# Patient Record
Sex: FEMALE | Race: BLACK OR AFRICAN AMERICAN | Employment: OTHER | URBAN - METROPOLITAN AREA
[De-identification: names, ages, dates, MRNs, and addresses within clinical notes are randomized per-mention and may not be internally consistent; named-entity substitution may affect disease eponyms.]

---

## 2019-01-21 ENCOUNTER — APPOINTMENT (OUTPATIENT)
Dept: GENERAL RADIOLOGY | Age: 69
End: 2019-01-21
Payer: COMMERCIAL

## 2019-01-21 ENCOUNTER — HOSPITAL ENCOUNTER (EMERGENCY)
Age: 69
Discharge: HOME | End: 2019-01-21
Payer: COMMERCIAL

## 2019-01-21 VITALS
HEART RATE: 99 BPM | RESPIRATION RATE: 16 BRPM | DIASTOLIC BLOOD PRESSURE: 73 MMHG | TEMPERATURE: 98.5 F | OXYGEN SATURATION: 95 % | BODY MASS INDEX: 42.1 KG/M2 | SYSTOLIC BLOOD PRESSURE: 112 MMHG | WEIGHT: 268.8 LBS

## 2019-01-21 DIAGNOSIS — J01.90 ACUTE SINUSITIS, RECURRENCE NOT SPECIFIED, UNSPECIFIED LOCATION: Primary | ICD-10-CM

## 2019-01-21 PROCEDURE — 71046 X-RAY EXAM CHEST 2 VIEWS: CPT

## 2019-01-21 PROCEDURE — 99284 EMERGENCY DEPT VISIT MOD MDM: CPT

## 2019-01-21 RX ORDER — LEVOFLOXACIN 500 MG/1
500 TABLET, FILM COATED ORAL DAILY
Qty: 10 TABLET | Refills: 0 | Status: SHIPPED | OUTPATIENT
Start: 2019-01-21 | End: 2019-01-31

## 2019-01-21 RX ORDER — FLUTICASONE PROPIONATE 50 MCG
1 SPRAY, SUSPENSION (ML) NASAL DAILY
Qty: 1 BOTTLE | Refills: 0 | Status: SHIPPED | OUTPATIENT
Start: 2019-01-21

## 2019-01-21 ASSESSMENT — ENCOUNTER SYMPTOMS
DIARRHEA: 0
ABDOMINAL PAIN: 0
SINUS PAIN: 1
COUGH: 1
SHORTNESS OF BREATH: 0
CHEST TIGHTNESS: 0
VOMITING: 0
NAUSEA: 0

## 2020-04-20 ENCOUNTER — HOSPITAL ENCOUNTER (EMERGENCY)
Age: 70
Discharge: HOME | End: 2020-04-20
Attending: EMERGENCY MEDICINE
Payer: COMMERCIAL

## 2020-04-20 VITALS
HEART RATE: 81 BPM | TEMPERATURE: 97.4 F | BODY MASS INDEX: 42.38 KG/M2 | DIASTOLIC BLOOD PRESSURE: 92 MMHG | OXYGEN SATURATION: 96 % | HEIGHT: 67 IN | WEIGHT: 270 LBS | RESPIRATION RATE: 18 BRPM | SYSTOLIC BLOOD PRESSURE: 148 MMHG

## 2020-04-20 PROCEDURE — 99283 EMERGENCY DEPT VISIT LOW MDM: CPT

## 2020-04-20 RX ORDER — DIPHENHYDRAMINE HCL 25 MG
25 TABLET ORAL EVERY 6 HOURS PRN
COMMUNITY

## 2020-04-20 RX ORDER — ATORVASTATIN CALCIUM 10 MG/1
10 TABLET, FILM COATED ORAL DAILY
COMMUNITY

## 2020-04-20 RX ORDER — IPRATROPIUM BROMIDE AND ALBUTEROL SULFATE 2.5; .5 MG/3ML; MG/3ML
1 SOLUTION RESPIRATORY (INHALATION) EVERY 6 HOURS PRN
COMMUNITY

## 2020-04-20 RX ORDER — TIZANIDINE 4 MG/1
4 TABLET ORAL NIGHTLY
COMMUNITY

## 2020-04-20 RX ORDER — CLOPIDOGREL BISULFATE 75 MG/1
75 TABLET ORAL DAILY
COMMUNITY

## 2020-04-20 RX ORDER — FAMOTIDINE 20 MG/1
20 TABLET, FILM COATED ORAL EVERY 6 HOURS PRN
COMMUNITY

## 2020-04-20 RX ORDER — M-VIT,TX,IRON,MINS/CALC/FOLIC 27MG-0.4MG
1 TABLET ORAL DAILY
COMMUNITY

## 2020-04-20 RX ORDER — NITROGLYCERIN 0.4 MG/1
0.4 TABLET SUBLINGUAL EVERY 5 MIN PRN
COMMUNITY

## 2020-04-20 ASSESSMENT — ENCOUNTER SYMPTOMS
DIARRHEA: 0
SHORTNESS OF BREATH: 0
ABDOMINAL PAIN: 0
VOMITING: 0
NAUSEA: 0

## 2020-04-20 NOTE — ED PROVIDER NOTES
[address] [facility]        Pt Name: Destini Pearson  MRN: [id number]  Birthdate 1950  Date of evaluation: 4/20/2020  Provider: Jennie Pennington PA-C  PCP: Roly Valentin MD     I have seen and evaluated this patient with my supervising physician Quang Ruvalcaba, *. CHIEF COMPLAINT       Chief Complaint   Patient presents with    Airway Obstruction     Pt brought in per [facility] from [facility] pt states while eating a hamburger a piece became lodged in her throat. Pt is able to speak in full sentences, pt is able to swallow her saliva. Pt states if she tries to drink she brings it back up. HISTORY OF PRESENT ILLNESS   (Location, Timing/Onset, Context/Setting, Quality, Duration, Modifying Factors, Severity, Associated Signs and Symptoms)  Note limiting factors. Destini Pearson is a 78 y.o. female who presents to the emergency department today for evaluation for a foreign body in her esophagus. The patient states that she has had reflux in the past, and she states that she has had esophageal strictures, she states that she has needed her esophagus stretched. The patient states that around 1215 she was eating a cheeseburger, and she feels that a portion of the cheeseburger got caught in her throat. The patient states that she tried to cough up the portion of the food, but she still feels that it is stuck. She is able to talk and tolerate her secretions however anytime that she drinks anything it comes right back up. The patient has no chest pain or shortness of breath. She has no abdominal pain. She has no nausea or vomiting. The patient otherwise has no complaints at this time    Nursing Notes were all reviewed and agreed with or any disagreements were addressed in the HPI.     REVIEW OF SYSTEMS    (2-9 systems for level 4, 10 or more for level 5)     Review of Systems   Constitutional: Negative for activity change, appetite change, chills and fever. Respiratory: Negative for shortness of breath. Cardiovascular: Negative for chest pain. Gastrointestinal: Negative for abdominal pain, diarrhea, nausea and vomiting. Positives and Pertinent negatives as per HPI. Except as noted above in the ROS, all other systems were reviewed and negative. PAST MEDICAL HISTORY     Past Medical History:   Diagnosis Date    Anemia     Asthma     Bipolar 1 disorder ([facility]     [facility]     Diabetes mellitus (HCC)     GERD (gastroesophageal reflux disease)     Hypertension     Osteoarthritis     Overactive bladder     Thyroid [facility] ([facility]     Thyroid disease          SURGICAL HISTORY     Past Surgical History:   Procedure Laterality Date    MASTECTOMY      left    SHOULDER SURGERY      left rotator cuff    THYROID SURGERY      TOTAL HIP ARTHROPLASTY      right         CURRENTMEDICATIONS       Previous Medications    ACETAMINOPHEN (TYLENOL) 325 MG TABLET    Take 650 mg by mouth every 4 hours as needed. ACETAMINOPHEN (TYLENOL) 500 MG TABLET    Take 500 mg by mouth every 8 hours as needed for Pain. ALBUTEROL (VENTOLIN HFA) 108 (90 BASE) MCG/ACT INHALER    Inhale 2 puffs into the lungs every 6 hours as needed. AMLODIPINE (NORVASC) 10 MG TABLET    Take 5 mg by mouth daily. ARIPIPRAZOLE (ABILIFY) 20 MG TABLET    Take 20 mg by mouth daily. ASPIRIN 81 MG EC TABLET    Take 1 tablet by mouth daily. May restart in AM.    ATORVASTATIN (LIPITOR) 10 MG TABLET    Take 10 mg by mouth daily    CAPSICUM OLEORESIN (TRIXAICIN) 0.025 % CREAM    Apply  topically 3 times daily. Apply topically 3 times daily. CETIRIZINE (ZYRTEC) 10 MG TABLET    Take 10 mg by mouth daily.     CLOPIDOGREL (PLAVIX) 75 MG TABLET    Take 75 mg by mouth daily    DIPHENHYDRAMINE (BENADRYL) 25 MG TABLET    Take 25 mg by mouth every 6 hours as needed for Itching    DIPHENHYDRAMINE-APAP, SLEEP, (TYLENOL PM EXTRA STRENGTH)  MG TABLET    Take 1 tablet by mouth nightly as needed for Sleep. EMOLLIENT (EUCERIN PLUS EX)    Apply  topically. FAMOTIDINE (PEPCID) 20 MG TABLET    Take 20 mg by mouth every 6 hours as needed    FLUTICASONE (FLONASE) 50 MCG/ACT NASAL SPRAY    1 spray by Each Nare route daily    FLUTICASONE-SALMETEROL (ADVAIR DISKUS) 250-50 MCG/DOSE AEPB    Inhale 1 puff into the lungs every 12 hours. GABAPENTIN (NEURONTIN) 300 MG CAPSULE    Take 600 mg by mouth 3 times daily. GUAIFENESIN (MUCINEX) 600 MG SR TABLET    Take 1,200 mg by mouth 2 times daily. HYDROCHLOROTHIAZIDE (HYDRODIURIL) 25 MG TABLET    Take 25 mg by mouth daily. HYDROXYCHLOROQUINE (PLAQUENIL) 200 MG TABLET    Take 200 mg by mouth 2 times daily. IBUPROFEN (ADVIL;MOTRIN) 400 MG TABLET    Take 400 mg by mouth 2 times daily as needed. May restart in AM.    IPRATROPIUM-ALBUTEROL (DUONEB) 0.5-2.5 (3) MG/3ML SOLN NEBULIZER SOLUTION    Inhale 1 vial into the lungs every 6 hours as needed for Shortness of Breath    LEVOTHYROXINE (LEVOTHROID) 137 MCG TABLET    Take 200 mcg by mouth daily     LORAZEPAM (ATIVAN) 0.5 MG TABLET    Take 0.5 mg by mouth every 8 hours as needed for Anxiety. MAGNESIUM OXIDE (MAG-OX) 400 MG TABLET    Take 400 mg by mouth 2 times daily. MELATONIN    Take 1 mg by mouth daily. METFORMIN (GLUCOPHAGE) 500 MG TABLET    Take 1,000 mg by mouth 3 times daily (with meals). METOPROLOL (LOPRESSOR) 25 MG TABLET    Take 12.5 mg by mouth daily. MINERAL OIL-HYDROPHILIC PETROLATUM (HYDROPHOR) OINTMENT    Apply  topically as needed. Apply topically as needed. MONTELUKAST (SINGULAIR) 10 MG TABLET    Take 10 mg by mouth nightly. MULTIPLE VITAMINS-MINERALS (THERAPEUTIC MULTIVITAMIN-MINERALS) TABLET    Take 1 tablet by mouth daily    NITROGLYCERIN (NITROSTAT) 0.4 MG SL TABLET    Place 0.4 mg under the tongue every 5 minutes as needed for Chest pain up to max of 3 total doses.  If no relief after 1 CAPSAICIN (THERAGEN) 0.025 % CREAM    Apply  topically 2 times daily. Apply topically 2 times daily. CHOLECALCIFEROL (VITAMIN D PO)    Take  by mouth. CYCLOBENZAPRINE (FLEXERIL) 10 MG TABLET    Take 10 mg by mouth nightly as needed. DIAZEPAM (VALIUM) 5 MG TABLET    Take 5 mg by mouth. Take 2 tabs, one hour prior to procedure; may repeat with 1 tab, 20 minutes prior to procedure     DICLOFENAC SODIUM 1 % GEL    Apply 2 g topically 3 times daily as needed. DOCUSATE SODIUM (COLACE) 100 MG CAPSULE    Take 200 mg by mouth 2 times daily. FERROUS SULFATE 325 (65 FE) MG TABLET    Take 325 mg by mouth 3 times daily (with meals). LORATADINE (CLARITIN) 10 MG TABLET    Take 10 mg by mouth daily. MOMETASONE (NASONEX) 50 MCG/ACT NASAL SPRAY    2 sprays by Nasal route daily. OMEPRAZOLE PO    Take 20 mg by mouth daily. OXYCODONE-ACETAMINOPHEN (PERCOCET) 5-325 MG PER TABLET    Take 1 tablet by mouth. OYSTER CALCIUM (OYST-RADHA) 500 MG TABS    Take 500 mg by mouth daily. PRAVASTATIN (PRAVACHOL) 20 MG TABLET    Take 20 mg by mouth daily. SENNA-DOCUSATE (GNP SENNA PLUS) 8.6-50 MG PER TABLET    Take 1 tablet by mouth daily as needed. SIMVASTATIN (ZOCOR) 20 MG TABLET    Take 20 mg by mouth. SODIUM CHLORIDE (OCEAN, BABY AYR) 0.65 % NASAL SPRAY    1 spray by Nasal route as needed for Congestion. SPACER/AERO-HOLDING CHAMBERS (EASIVENT) MISC    by Does not apply route. THEOPHYLLINE (ABAD-24) 300 MG SR CAPSULE    Take 300 mg by mouth daily. TRAMADOL (ULTRAM) 50 MG TABLET    Take 50 mg by mouth every 6 hours as needed for Pain. VITAMIN D (ERGOCALCIFEROL) 73941 UNITS CAPS CAPSULE    Take 50,000 Units by mouth once a week.  On Friday              (Please note that portions of this note were completed with a voice recognition program.  Efforts were made to edit the dictations but occasionally words are mis-transcribed.)    Keegan Cruz PA-C (electronically signed)

## 2020-04-20 NOTE — ED PROVIDER NOTES
mg under the tongue every 5 minutes as needed for Chest pain up to max of 3 total doses. If no relief after 1 dose, call 911.  tiZANidine (ZANAFLEX) 4 MG tablet Take 4 mg by mouth nightly      fluticasone (FLONASE) 50 MCG/ACT nasal spray 1 spray by Each Nare route daily 1 Bottle 0    sitaGLIPtan (JANUVIA) 100 MG tablet Take 100 mg by mouth daily.  Olopatadine HCl 0.2 % SOLN Apply 1 drop to eye daily. Both eyes      cetirizine (ZYRTEC) 10 MG tablet Take 10 mg by mouth daily.  Melatonin Take 1 mg by mouth daily.  montelukast (SINGULAIR) 10 MG tablet Take 10 mg by mouth nightly.  hydroxychloroquine (PLAQUENIL) 200 MG tablet Take 200 mg by mouth 2 times daily.  acetaminophen (TYLENOL) 500 MG tablet Take 500 mg by mouth every 8 hours as needed for Pain.  LORazepam (ATIVAN) 0.5 MG tablet Take 0.5 mg by mouth every 8 hours as needed for Anxiety.  diphenhydrAMINE-APAP, sleep, (TYLENOL PM EXTRA STRENGTH)  MG tablet Take 1 tablet by mouth nightly as needed for Sleep.  aspirin 81 MG EC tablet Take 1 tablet by mouth daily. May restart in AM.      ibuprofen (ADVIL;MOTRIN) 400 MG tablet Take 400 mg by mouth 2 times daily as needed. May restart in AM.      amlodipine (NORVASC) 10 MG tablet Take 5 mg by mouth daily.  hydrochlorothiazide (HYDRODIURIL) 25 MG tablet Take 25 mg by mouth daily.  levothyroxine (LEVOTHROID) 137 MCG tablet Take 200 mcg by mouth daily       metoprolol (LOPRESSOR) 25 MG tablet Take 12.5 mg by mouth daily.  sertraline (ZOLOFT) 100 MG tablet Take 100 mg by mouth daily.  aripiprazole (ABILIFY) 20 MG tablet Take 20 mg by mouth daily.  tolterodine (DETROL) 2 MG tablet Take 2 mg by mouth 2 times daily.  trazodone (DESYREL) 100 MG tablet Take 100 mg by mouth nightly.  fluticasone-salmeterol (ADVAIR DISKUS) 250-50 MCG/DOSE AEPB Inhale 1 puff into the lungs every 12 hours.         magnesium oxide (MAG-OX) 400 MG tablet Take 400 mg by mouth 2 times daily.  gabapentin (NEURONTIN) 300 MG capsule Take 600 mg by mouth 3 times daily.  metformin (GLUCOPHAGE) 500 MG tablet Take 1,000 mg by mouth 3 times daily (with meals).  acetaminophen (TYLENOL) 325 MG tablet Take 650 mg by mouth every 4 hours as needed.  albuterol (VENTOLIN HFA) 108 (90 BASE) MCG/ACT inhaler Inhale 2 puffs into the lungs every 6 hours as needed.  Emollient (EUCERIN PLUS EX) Apply  topically.  mineral oil-hydrophilic petrolatum (HYDROPHOR) ointment Apply  topically as needed. Apply topically as needed.  guaiFENesin (MUCINEX) 600 MG SR tablet Take 1,200 mg by mouth 2 times daily.  Nystatin POWD by Does not apply route 2 times daily. Apply topically to affected area      capsicum oleoresin (TRIXAICIN) 0.025 % cream Apply  topically 3 times daily. Apply topically 3 times daily. PHYSICAL EXAM  Vitals: BP (!) 159/79   Pulse 85   Temp 97.4 °F (36.3 °C) (Oral)   Resp 17   Ht 5' 7\" (1.702 m)   Wt 270 lb (122.5 kg)   SpO2 96%   BMI 42.29 kg/m²   Constitutional:  78 y.o. female alert  HENT:  Atraumatic, oral mucosa moist  Neck:  No visible JVD, supple  Chest/Lungs:  Respiratory effort normal, clear, regular  Abdomen:  Non-distended, soft, NT  Back:  No gross deformity  Extremities:  Normal tone and perfusion    Medical Decision Making and Plan: Briefly, this is an 78 y. o.female who presented with concern for hamburger in esophagus. Vomited while here, now feels better. Hx of stricture in the past.  Had radiation treatment years ago. Tolerated po fluids without difficulty. Can see GI as an outpatient. Jemma Brown was given appropriate discharge instructions. Referral to follow up provider.      For further details of Jemma Brown's Emergency Department encounter, please see documentation by advanced practice provider LAUREN Lane.    FOLLOW UP:    Teresita Chino MD  [id number]   [id number]

## 2023-01-22 ENCOUNTER — HOSPITAL ENCOUNTER (EMERGENCY)
Age: 73
Discharge: HOME | End: 2023-01-23
Payer: COMMERCIAL

## 2023-01-22 DIAGNOSIS — M54.50 RIGHT-SIDED LOW BACK PAIN WITHOUT SCIATICA, UNSPECIFIED CHRONICITY: Primary | ICD-10-CM

## 2023-01-22 PROCEDURE — 99283 EMERGENCY DEPT VISIT LOW MDM: CPT

## 2023-01-22 RX ORDER — LIDOCAINE 4 G/G
1 PATCH TOPICAL ONCE
Status: DISCONTINUED | OUTPATIENT
Start: 2023-01-23 | End: 2023-01-23 | Stop reason: HOSPADM

## 2023-01-22 RX ORDER — HYDROCODONE BITARTRATE AND ACETAMINOPHEN 5; 325 MG/1; MG/1
2 TABLET ORAL ONCE
Status: COMPLETED | OUTPATIENT
Start: 2023-01-23 | End: 2023-01-23

## 2023-01-22 RX ORDER — ONDANSETRON 4 MG/1
4 TABLET, ORALLY DISINTEGRATING ORAL ONCE
Status: COMPLETED | OUTPATIENT
Start: 2023-01-23 | End: 2023-01-23

## 2023-01-23 ENCOUNTER — APPOINTMENT (OUTPATIENT)
Dept: GENERAL RADIOLOGY | Age: 73
End: 2023-01-23
Payer: COMMERCIAL

## 2023-01-23 VITALS
OXYGEN SATURATION: 100 % | SYSTOLIC BLOOD PRESSURE: 153 MMHG | HEART RATE: 78 BPM | TEMPERATURE: 98.4 F | RESPIRATION RATE: 23 BRPM | DIASTOLIC BLOOD PRESSURE: 100 MMHG

## 2023-01-23 PROCEDURE — 6370000000 HC RX 637 (ALT 250 FOR IP): Performed by: PHYSICIAN ASSISTANT

## 2023-01-23 PROCEDURE — 72100 X-RAY EXAM L-S SPINE 2/3 VWS: CPT

## 2023-01-23 PROCEDURE — 73502 X-RAY EXAM HIP UNI 2-3 VIEWS: CPT

## 2023-01-23 RX ORDER — LIDOCAINE 50 MG/G
1 PATCH TOPICAL DAILY
Qty: 30 PATCH | Refills: 0 | Status: SHIPPED | OUTPATIENT
Start: 2023-01-23

## 2023-01-23 RX ORDER — CYCLOBENZAPRINE HCL 10 MG
10 TABLET ORAL 3 TIMES DAILY PRN
Qty: 21 TABLET | Refills: 0 | Status: SHIPPED | OUTPATIENT
Start: 2023-01-23 | End: 2023-02-02

## 2023-01-23 RX ADMIN — HYDROCODONE BITARTRATE AND ACETAMINOPHEN 2 TABLET: 5; 325 TABLET ORAL at 01:25

## 2023-01-23 RX ADMIN — ONDANSETRON 4 MG: 4 TABLET, ORALLY DISINTEGRATING ORAL at 01:26

## 2023-01-23 ASSESSMENT — ENCOUNTER SYMPTOMS
BACK PAIN: 1
VOMITING: 0
NAUSEA: 0

## 2023-01-23 NOTE — ED PROVIDER NOTES
[facility]        Pt Name: Hari Rodriguez  MRN: [id number]  [place] 1950  Date of evaluation: 1/22/2023  Provider: Ben Nevarez PA-C  PCP: Candace Arreola MD  Note Started: 12:15 AM EST 1/23/23      ASA. I have evaluated this patient. My supervising physician was available for consultation. CHIEF COMPLAINT       Chief Complaint   Patient presents with    Hip Pain     [facility] c/o right hip pain 10/10 with movement after getting out of bed this morning, no falls, had hip replacement in 2007. HISTORY OF PRESENT ILLNESS: 1 or more Elements     History From: Patient  Limitations to history : None    Jemma Brown is a 81 y.o. female who presents to the emergency department today for evaluation for right hip pain/right lower back pain. The patient states that earlier today, she states that she was trying to get out of bed, and she did notice a pain to her right lower back, with radiation towards her hip. The patient denies falling or injuring herself in any other way. The patient states that she did have a history of a hip replacement in 2007. The patient is reporting pain with touch, certain movements, however states that she is still able to move her leg without difficulty. Patient denies any radiculopathy. She denies any bowel or bladder incontinence or retention. No saddle anesthesias. Patient has no numbness, tingling or weakness, patient otherwise has no other complaints. Nursing Notes were all reviewed and agreed with or any disagreements were addressed in the HPI. REVIEW OF SYSTEMS :      Review of Systems   Constitutional:  Negative for activity change, appetite change and fever. Gastrointestinal:  Negative for nausea and vomiting. Genitourinary:  Negative for difficulty urinating. Musculoskeletal:  Positive for arthralgias and back pain. Skin:  Negative for wound. Neurological:  Negative for weakness and numbness. Positives and Pertinent negatives as per HPI. SURGICAL HISTORY     Past Surgical History:   Procedure Laterality Date    MASTECTOMY      left    SHOULDER SURGERY      left rotator cuff    THYROID SURGERY      TOTAL HIP ARTHROPLASTY      right       CURRENTMEDICATIONS       Previous Medications    ACETAMINOPHEN (TYLENOL) 325 MG TABLET    Take 650 mg by mouth every 4 hours as needed. ACETAMINOPHEN (TYLENOL) 500 MG TABLET    Take 500 mg by mouth every 8 hours as needed for Pain. ALBUTEROL (VENTOLIN HFA) 108 (90 BASE) MCG/ACT INHALER    Inhale 2 puffs into the lungs every 6 hours as needed. AMLODIPINE (NORVASC) 10 MG TABLET    Take 5 mg by mouth daily. ARIPIPRAZOLE (ABILIFY) 20 MG TABLET    Take 20 mg by mouth daily. ASPIRIN 81 MG EC TABLET    Take 1 tablet by mouth daily. May restart in AM.    ATORVASTATIN (LIPITOR) 10 MG TABLET    Take 10 mg by mouth daily    CAPSICUM OLEORESIN (TRIXAICIN) 0.025 % CREAM    Apply  topically 3 times daily. Apply topically 3 times daily. CETIRIZINE (ZYRTEC) 10 MG TABLET    Take 10 mg by mouth daily. CLOPIDOGREL (PLAVIX) 75 MG TABLET    Take 75 mg by mouth daily    DIPHENHYDRAMINE (BENADRYL) 25 MG TABLET    Take 25 mg by mouth every 6 hours as needed for Itching    DIPHENHYDRAMINE-APAP, SLEEP, (TYLENOL PM EXTRA STRENGTH)  MG TABLET    Take 1 tablet by mouth nightly as needed for Sleep. EMOLLIENT (EUCERIN PLUS EX)    Apply  topically. FAMOTIDINE (PEPCID) 20 MG TABLET    Take 20 mg by mouth every 6 hours as needed    FLUTICASONE (FLONASE) 50 MCG/ACT NASAL SPRAY    1 spray by Each Nare route daily    FLUTICASONE-SALMETEROL (ADVAIR DISKUS) 250-50 MCG/DOSE AEPB    Inhale 1 puff into the lungs every 12 hours. GABAPENTIN (NEURONTIN) 300 MG CAPSULE    Take 600 mg by mouth 3 times daily. GUAIFENESIN (MUCINEX) 600 MG SR TABLET    Take 1,200 mg by mouth 2 times daily.     HYDROCHLOROTHIAZIDE (HYDRODIURIL) 25 MG TABLET    Take 25 mg by mouth daily. HYDROXYCHLOROQUINE (PLAQUENIL) 200 MG TABLET    Take 200 mg by mouth 2 times daily. IBUPROFEN (ADVIL;MOTRIN) 400 MG TABLET    Take 400 mg by mouth 2 times daily as needed. May restart in AM.    IPRATROPIUM-ALBUTEROL (DUONEB) 0.5-2.5 (3) MG/3ML SOLN NEBULIZER SOLUTION    Inhale 1 vial into the lungs every 6 hours as needed for Shortness of Breath    LEVOTHYROXINE (LEVOTHROID) 137 MCG TABLET    Take 200 mcg by mouth daily     LORAZEPAM (ATIVAN) 0.5 MG TABLET    Take 0.5 mg by mouth every 8 hours as needed for Anxiety. MAGNESIUM OXIDE (MAG-OX) 400 MG TABLET    Take 400 mg by mouth 2 times daily. MELATONIN    Take 1 mg by mouth daily. METFORMIN (GLUCOPHAGE) 500 MG TABLET    Take 1,000 mg by mouth 3 times daily (with meals). METOPROLOL (LOPRESSOR) 25 MG TABLET    Take 12.5 mg by mouth daily. MINERAL OIL-HYDROPHILIC PETROLATUM (HYDROPHOR) OINTMENT    Apply  topically as needed. Apply topically as needed. MONTELUKAST (SINGULAIR) 10 MG TABLET    Take 10 mg by mouth nightly. MULTIPLE VITAMINS-MINERALS (THERAPEUTIC MULTIVITAMIN-MINERALS) TABLET    Take 1 tablet by mouth daily    NITROGLYCERIN (NITROSTAT) 0.4 MG SL TABLET    Place 0.4 mg under the tongue every 5 minutes as needed for Chest pain up to max of 3 total doses. If no relief after 1 dose, call 911. NYSTATIN POWD    by Does not apply route 2 times daily. Apply topically to affected area    OLOPATADINE HCL 0.2 % SOLN    Apply 1 drop to eye daily. Both eyes    SERTRALINE (ZOLOFT) 100 MG TABLET    Take 100 mg by mouth daily. SITAGLIPTAN (JANUVIA) 100 MG TABLET    Take 100 mg by mouth daily. TIZANIDINE (ZANAFLEX) 4 MG TABLET    Take 4 mg by mouth nightly    TOLTERODINE (DETROL) 2 MG TABLET    Take 2 mg by mouth 2 times daily. TRAZODONE (DESYREL) 100 MG TABLET    Take 100 mg by mouth nightly.        ALLERGIES     Codeine, Lisinopril, Pcn [penicillins], Phosphate, and Sulfa antibiotics    FAMILYHISTORY     No family history on file.     SOCIAL HISTORY       Social History     Tobacco Use    Smoking status: Never    Smokeless tobacco: Never   Substance Use Topics    Alcohol use: No    Drug use: Not Currently       SCREENINGS                         CIWA Assessment  BP: (!) 153/100  Heart Rate: 78           PHYSICAL EXAM  1 or more Elements     ED Triage Vitals [01/22/23 2351]   BP Temp Temp Source Heart Rate Resp SpO2 Height Weight   -- 98.4 °F (36.9 °C) Oral 78 23 100 % -- --       Physical Exam  Vitals and nursing note reviewed.   Constitutional:       Appearance: She is well-developed. She is not diaphoretic.   HENT:      Head: Normocephalic and atraumatic.      Right Ear: External ear normal.      Left Ear: External ear normal.      Nose: Nose normal.   Eyes:      General:         Right eye: No discharge.         Left eye: No discharge.   Neck:      Trachea: No tracheal deviation.   Cardiovascular:      Pulses: Normal pulses.   Pulmonary:      Effort: Pulmonary effort is normal. No respiratory distress.   Musculoskeletal:         General: Normal range of motion.      Cervical back: Normal range of motion and neck supple.      Comments: There is tenderness palpation to the paraspinous muscles of the right lower lumbar spine, there is no midline tenderness.  No bony step-offs or crepitus.  Minimal tenderness noted to the right lateral hip.   Skin:     General: Skin is warm and dry.   Neurological:      Mental Status: She is alert and oriented to person, place, and time.      Comments: Motor strength of bilateral lower extremities is 5/5 throughout.  Normal sensation light touch.  Patellar reflexes  are 2+ bilaterally.  Posterior tibialis pulse and dorsalis pedis pulses are 2+ bilaterally.  Negative straight leg raise.  Normal dorsiflexion and plantar flexion.  Full range of motion of hip, knee and ankle joints.     Psychiatric:         Behavior: Behavior normal.           DIAGNOSTIC  RESULTS   LABS:    Labs Reviewed - No data to display    When ordered only abnormal lab results are displayed. All other labs were within normal range or not returned as of this dictation. EKG: When ordered, EKG's are interpreted by the Emergency Department Physician in the absence of a cardiologist.  Please see their note for interpretation of EKG. RADIOLOGY:   Non-plain film images such as CT, Ultrasound and MRI are read by the radiologist. Plain radiographic images are visualized and preliminarily interpreted by the ED Provider with the below findings:        Interpretation per the Radiologist below, if available at the time of this note:    XR HIP 2-3 VW W PELVIS RIGHT   Preliminary Result   Intact right hip arthroplasty without fracture or malalignment and without   loosening of prosthetic components. On rest of AP view of pelvis including AP view of left hip joint there is no   evidence of fracture or malalignment. XR LUMBAR SPINE (2-3 VIEWS)   Preliminary Result   No definable acute fracture or dislocation in the lumbar spine or in the   visualized upper sacrum. Evidence of moderate to severe multilevel degenerative disc disease and facet   osteoarthritis in the mid and lower lumbar spine and lumbosacral junction. No results found. No results found. PROCEDURES   Unless otherwise noted below, none     Procedures    CRITICAL CARE TIME (.cctime)       PAST MEDICAL HISTORY      has a past medical history of Anemia, Asthma, Bipolar 1 disorder ([address] COPD (chronic obstructive pulmonary disease) ([address] Diabetes mellitus ([address] GERD (gastroesophageal reflux disease), Hypertension, Osteoarthritis, Overactive bladder, Thyroid cancer ([address] and Thyroid disease.      EMERGENCY DEPARTMENT COURSE and DIFFERENTIAL DIAGNOSIS/MDM:   Vitals:    Vitals:    01/22/23 2351 01/23/23 0022   BP:  (!) 153/100   Pulse: 78    Resp: 23    Temp: 98.4 °F (36.9 °C)    TempSrc: Oral    SpO2: 100% Patient was given the following medications:  Medications   lidocaine 4 % external patch 1 patch (1 patch TransDERmal Patch Applied 1/23/23 0127)   HYDROcodone-acetaminophen (NORCO) 5-325 MG per tablet 2 tablet (2 tablets Oral Given 1/23/23 0125)   ondansetron (ZOFRAN-ODT) disintegrating tablet 4 mg (4 mg Oral Given 1/23/23 0126)             Is this patient to be included in the SEP-1 Core Measure due to severe sepsis or septic shock? No   Exclusion criteria - the patient is NOT to be included for SEP-1 Core Measure due to: Infection is not suspected    Chronic Conditions affecting care:  has a past medical history of Anemia, Asthma, Bipolar 1 disorder ([address] COPD (chronic obstructive pulmonary disease) ([address] Diabetes mellitus ([address] GERD (gastroesophageal reflux disease), Hypertension, Osteoarthritis, Overactive bladder, Thyroid cancer ([facility] and Thyroid disease. CONSULTS: (Who and What was discussed)  None      Social Determinants : None    Records Reviewed (Source): Previous primary care office visit, and telephone encounters    CC/HPI Summary, DDx, ED Course, and Reassessment: Briefly, this is a 62-year-old female who presents emergency department today for evaluation for right hip pain, as well as right-sided low back pain. Patient states that when she was trying to get out of bed earlier today, she did notice pain. No fall or injury. Patient does have a past surgical history of a hip replacement 2007. On exam she does have tenderness to palpation to the paraspinous muscles of the right lower lumbar spine, to the lateral aspect of the hip, she is forage motion and there are no focal deficits    Disposition Considerations (tests considered but not done, Admit vs D/C, Shared Decision Making, Pt Expectation of Test or Tx.):     X-rays are negative for any acute process. I did consider CT imaging however patient had did not fall, and she is overall feeling better.   The patient was given pain medication in the ED and is overall feeling better. Patient is otherwise wheelchair-bound. I did discuss with the patient that her symptoms today could certainly be due to to right-sided lower back pain. We will treat with Flexeril and Lidoderm patches for home. Patient will be discharged back to the nursing home facility in stable condition, she started in fall with her primary care physician within 1 week if there is no improvement. She is to return to the ED for new or worsening symptoms, stable for discharge. No results found for this visit on 01/22/23. I estimate there is LOW risk for COMPARTMENT SYNDROME, DEEP VENOUS THROMBOSIS, SEPTIC ARTHRITIS, TENDON OR NEUROVASCULAR INJURY, thus I consider the discharge disposition reasonable. Jemma Brown and I have discussed the diagnosis and risks, and we agree with discharging home to follow-up with their primary doctor or the referral orthopedist. We also discussed returning to the Emergency Department immediately if new or worsening symptoms occur. We have discussed the symptoms which are most concerning (e.g., changing or worsening pain, numbness, weakness) that necessitate immediate return. Final Impression    1. Right-sided low back pain without sciatica, unspecified chronicity        Blood pressure (!) 153/100, pulse 78, temperature 98.4 °F (36.9 °C), temperature source Oral, resp. rate 23, SpO2 100 %. I am the Primary Clinician of Record. FINAL IMPRESSION      1.  Right-sided low back pain without sciatica, unspecified chronicity          DISPOSITION/PLAN     DISPOSITION Decision To Discharge 01/23/2023 02:42:48 AM      PATIENT REFERRED TO:  MD [facility] [address] [phone]    Schedule an appointment as soon as possible for a visit in 2 days      [facility] Emergency Department  [address] [place] [address]  [phone]    As needed, If symptoms worsen    DISCHARGE MEDICATIONS:  New Prescriptions    CYCLOBENZAPRINE (FLEXERIL) 10 MG TABLET    Take 1 tablet by mouth 3 times daily as needed for Muscle spasms    LIDOCAINE (LIDODERM) 5 %    Place 1 patch onto the skin daily 12 hours on, 12 hours off.        DISCONTINUED MEDICATIONS:  Discontinued Medications    No medications on file              (Please note that portions of this note were completed with a voice recognition program.  Efforts were made to edit the dictations but occasionally words are mis-transcribed.)    Azra Vidal PA-C (electronically signed)        Azra Vidal PA-C  01/23/23 1431

## 2023-01-23 NOTE — ED NOTES
Pt received DC instructions, transported by first care to [address] [place] [address], attempted report but no answer     Juan Pardo RN  01/23/23 7905

## 2023-09-04 ENCOUNTER — APPOINTMENT (OUTPATIENT)
Dept: GENERAL RADIOLOGY | Age: 73
End: 2023-09-04
Payer: COMMERCIAL

## 2023-09-04 ENCOUNTER — HOSPITAL ENCOUNTER (EMERGENCY)
Age: 73
Discharge: HOME | End: 2023-09-04
Attending: EMERGENCY MEDICINE
Payer: COMMERCIAL

## 2023-09-04 VITALS
RESPIRATION RATE: 17 BRPM | TEMPERATURE: 98.2 F | OXYGEN SATURATION: 96 % | HEIGHT: 64 IN | DIASTOLIC BLOOD PRESSURE: 69 MMHG | WEIGHT: 250 LBS | SYSTOLIC BLOOD PRESSURE: 116 MMHG | BODY MASS INDEX: 42.68 KG/M2 | HEART RATE: 80 BPM

## 2023-09-04 DIAGNOSIS — E86.0 DEHYDRATION WITH HYPONATREMIA: Primary | ICD-10-CM

## 2023-09-04 DIAGNOSIS — E87.1 DEHYDRATION WITH HYPONATREMIA: Primary | ICD-10-CM

## 2023-09-04 DIAGNOSIS — N39.0 ACUTE LOWER UTI: ICD-10-CM

## 2023-09-04 LAB
ALBUMIN SERPL-MCNC: 3.9 G/DL (ref 3.4–5)
ALBUMIN/GLOB SERPL: 1.3 {RATIO} (ref 1.1–2.2)
ALP SERPL-CCNC: 53 U/L (ref 40–129)
ALT SERPL-CCNC: 12 U/L (ref 10–40)
ANION GAP SERPL CALCULATED.3IONS-SCNC: 9 MMOL/L (ref 3–16)
AST SERPL-CCNC: 17 U/L (ref 15–37)
BACTERIA URNS QL MICRO: ABNORMAL /HPF
BASOPHILS # BLD: 0.1 K/UL (ref 0–0.2)
BASOPHILS NFR BLD: 1.5 %
BILIRUB SERPL-MCNC: <0.2 MG/DL (ref 0–1)
BILIRUB UR QL STRIP.AUTO: NEGATIVE
BUN SERPL-MCNC: 37 MG/DL (ref 7–20)
CALCIUM SERPL-MCNC: 10 MG/DL (ref 8.3–10.6)
CHLORIDE SERPL-SCNC: 96 MMOL/L (ref 99–110)
CLARITY UR: CLEAR
CO2 SERPL-SCNC: 30 MMOL/L (ref 21–32)
COLOR UR: YELLOW
CREAT SERPL-MCNC: 1 MG/DL (ref 0.6–1.2)
DEPRECATED RDW RBC AUTO: 14.6 % (ref 12.4–15.4)
EKG ATRIAL RATE: 80 BPM
EKG DIAGNOSIS: NORMAL
EKG P AXIS: 33 DEGREES
EKG P-R INTERVAL: 184 MS
EKG Q-T INTERVAL: 454 MS
EKG QRS DURATION: 162 MS
EKG QTC CALCULATION (BAZETT): 523 MS
EKG R AXIS: 54 DEGREES
EKG T AXIS: 12 DEGREES
EKG VENTRICULAR RATE: 80 BPM
EOSINOPHIL # BLD: 0.9 K/UL (ref 0–0.6)
EOSINOPHIL NFR BLD: 12 %
EPI CELLS #/AREA URNS AUTO: 1 /HPF (ref 0–5)
GFR SERPLBLD CREATININE-BSD FMLA CKD-EPI: >60 ML/MIN/{1.73_M2}
GLUCOSE SERPL-MCNC: 104 MG/DL (ref 70–99)
GLUCOSE UR STRIP.AUTO-MCNC: NEGATIVE MG/DL
HCT VFR BLD AUTO: 35.1 % (ref 36–48)
HGB BLD-MCNC: 11.5 G/DL (ref 12–16)
HGB UR QL STRIP.AUTO: NEGATIVE
HYALINE CASTS #/AREA URNS AUTO: 0 /LPF (ref 0–8)
KETONES UR STRIP.AUTO-MCNC: NEGATIVE MG/DL
LACTATE BLDV-SCNC: 1.6 MMOL/L (ref 0.4–2)
LEUKOCYTE ESTERASE UR QL STRIP.AUTO: ABNORMAL
LYMPHOCYTES # BLD: 2.3 K/UL (ref 1–5.1)
LYMPHOCYTES NFR BLD: 31.4 %
MCH RBC QN AUTO: 28.8 PG (ref 26–34)
MCHC RBC AUTO-ENTMCNC: 32.7 G/DL (ref 31–36)
MCV RBC AUTO: 88.2 FL (ref 80–100)
MONOCYTES # BLD: 0.7 K/UL (ref 0–1.3)
MONOCYTES NFR BLD: 9.3 %
NEUTROPHILS # BLD: 3.4 K/UL (ref 1.7–7.7)
NEUTROPHILS NFR BLD: 45.8 %
NITRITE UR QL STRIP.AUTO: NEGATIVE
PH UR STRIP.AUTO: 5.5 [PH] (ref 5–8)
PLATELET # BLD AUTO: 296 K/UL (ref 135–450)
PMV BLD AUTO: 7.7 FL (ref 5–10.5)
POTASSIUM SERPL-SCNC: 3.6 MMOL/L (ref 3.5–5.1)
PROT SERPL-MCNC: 7 G/DL (ref 6.4–8.2)
PROT UR STRIP.AUTO-MCNC: NEGATIVE MG/DL
RBC # BLD AUTO: 3.98 M/UL (ref 4–5.2)
RBC CLUMPS #/AREA URNS AUTO: 0 /HPF (ref 0–4)
SODIUM SERPL-SCNC: 135 MMOL/L (ref 136–145)
SP GR UR STRIP.AUTO: <=1.005 (ref 1–1.03)
TROPONIN, HIGH SENSITIVITY: 10 NG/L (ref 0–14)
TROPONIN, HIGH SENSITIVITY: 10 NG/L (ref 0–14)
UA COMPLETE W REFLEX CULTURE PNL UR: ABNORMAL
UA DIPSTICK W REFLEX MICRO PNL UR: YES
URN SPEC COLLECT METH UR: ABNORMAL
UROBILINOGEN UR STRIP-ACNC: 0.2 E.U./DL
WBC # BLD AUTO: 7.4 K/UL (ref 4–11)
WBC #/AREA URNS AUTO: 8 /HPF (ref 0–5)

## 2023-09-04 PROCEDURE — 71045 X-RAY EXAM CHEST 1 VIEW: CPT

## 2023-09-04 PROCEDURE — 2580000003 HC RX 258: Performed by: EMERGENCY MEDICINE

## 2023-09-04 PROCEDURE — 84484 ASSAY OF TROPONIN QUANT: CPT

## 2023-09-04 PROCEDURE — 85025 COMPLETE CBC W/AUTO DIFF WBC: CPT

## 2023-09-04 PROCEDURE — 6370000000 HC RX 637 (ALT 250 FOR IP): Performed by: EMERGENCY MEDICINE

## 2023-09-04 PROCEDURE — 93005 ELECTROCARDIOGRAM TRACING: CPT | Performed by: EMERGENCY MEDICINE

## 2023-09-04 PROCEDURE — 93010 ELECTROCARDIOGRAM REPORT: CPT | Performed by: INTERNAL MEDICINE

## 2023-09-04 PROCEDURE — 96360 HYDRATION IV INFUSION INIT: CPT

## 2023-09-04 PROCEDURE — 99285 EMERGENCY DEPT VISIT HI MDM: CPT

## 2023-09-04 PROCEDURE — 81001 URINALYSIS AUTO W/SCOPE: CPT

## 2023-09-04 PROCEDURE — 83605 ASSAY OF LACTIC ACID: CPT

## 2023-09-04 PROCEDURE — 80053 COMPREHEN METABOLIC PANEL: CPT

## 2023-09-04 RX ORDER — 0.9 % SODIUM CHLORIDE 0.9 %
1000 INTRAVENOUS SOLUTION INTRAVENOUS ONCE
Status: COMPLETED | OUTPATIENT
Start: 2023-09-04 | End: 2023-09-04

## 2023-09-04 RX ORDER — CEPHALEXIN 250 MG/1
500 CAPSULE ORAL ONCE
Status: COMPLETED | OUTPATIENT
Start: 2023-09-04 | End: 2023-09-04

## 2023-09-04 RX ORDER — CEPHALEXIN 500 MG/1
500 CAPSULE ORAL 2 TIMES DAILY
Qty: 10 CAPSULE | Refills: 0 | Status: SHIPPED | OUTPATIENT
Start: 2023-09-04 | End: 2023-09-09

## 2023-09-04 RX ADMIN — SODIUM CHLORIDE 1000 ML: 9 INJECTION, SOLUTION INTRAVENOUS at 02:20

## 2023-09-04 RX ADMIN — CEPHALEXIN 500 MG: 250 CAPSULE ORAL at 04:15

## 2023-09-04 ASSESSMENT — LIFESTYLE VARIABLES
HOW MANY STANDARD DRINKS CONTAINING ALCOHOL DO YOU HAVE ON A TYPICAL DAY: PATIENT DOES NOT DRINK
HOW OFTEN DO YOU HAVE A DRINK CONTAINING ALCOHOL: NEVER

## 2023-09-04 ASSESSMENT — PAIN - FUNCTIONAL ASSESSMENT: PAIN_FUNCTIONAL_ASSESSMENT: NONE - DENIES PAIN

## 2023-09-04 NOTE — ED PROVIDER NOTES
EMERGENCY MEDICINE ATTENDING NOTE  Miguel A Brown. Desi Gomez., DO, FACEP, [address]  [place] COMPLAINT  Chief Complaint   Patient presents with    Hypotension     RICKI mattson from [facility] by [facility] c/o hypotension. Per EMS, NH was states the pt's pressure was reading 60/40 and they couldn't get it to come up. Per EMS, pt's SBP was over 100 the whole time. HISTORY OF PRESENT ILLNESS  Jemma Brown is a 72 y.o. female who presents to the ED for evaluation of hypotension. Patient is coming from nursing facility apparently her systolic blood pressures in the 60s. Patient has been feeling very tired and sleepy. On arrival here her pressures actually 895 systolic. She actually states that the sleepiness has been going on for several months. States started in the summer when it started to get hot. States that the air conditioning at a facility is broke. She feels like she may just be dehydrated. She is denying any actual chest pain or shortness of breath. Has no numbness or tingling or lateralizing weakness. States occasionally she has a little lightheadedness when trying to stand. Has no other significant complaints or concerns at this time. Nursing/triage notes reviewed. No other complaints, modifying factors or associated symptoms. REVIEW OF SYSTEMS:  All systems are reviewed and are negative unless noted in the HPI.     PAST MEDICAL HISTORY  Past Medical History:   Diagnosis Date    Anemia     Asthma     Bipolar 1 disorder (HCC)     COPD (chronic obstructive pulmonary disease) (HCC)     Diabetes mellitus (HCC)     GERD (gastroesophageal reflux disease)     Hypertension     Osteoarthritis     Overactive bladder     Thyroid cancer ([address])     Thyroid disease        SURGICAL HISTORY  Past Surgical History:   Procedure Laterality Date    MASTECTOMY      left    SHOULDER SURGERY      left rotator cuff    THYROID SURGERY      TOTAL HIP ARTHROPLASTY      right       FAMILY HISTORY  History

## 2023-09-04 NOTE — ED NOTES
Report given to Amanda, v/u and denies any questions at this time.       Lashaun Edwards, JUNE  09/04/23 9982

## 2023-09-04 NOTE — ED NOTES
Patient resting in bed with no needs at this time. Patient updated on transport time and requested to get dressed, have IV removed and taken off the monitor. This RN assisted patient with needs at this time. Call light in reach, bed in low position with wheels locked.       Shayna Dumont RN  09/04/23 2439

## 2023-09-04 NOTE — ED NOTES
Patient transferred from [facility] to [address] [place] [address] without incident. Patient leaving in stable condition at this time. Discharge and education instructions reviewed. Patient verbalized understanding, teach-back successful. Patient denied questions at this time. No acute distress noted. Patient instructed to follow-up as noted - return to emergency department if symptoms worsen. Patient verbalized understanding. Discharged per EDMD with discharged instructions.        Gianluca Owens RN  09/04/23 6278

## 2023-09-04 NOTE — ED NOTES
Patient called out stating she wanted to sit in the chair. Patient assisted from bed to chair by this RN without incident. Bedside table and call light in reach. Patient requested and given cup of water. Patient denies any other needs at this time.       Kathie Garcia RN  09/04/23 8050

## 2024-06-16 ENCOUNTER — HOSPITAL ENCOUNTER (EMERGENCY)
Age: 74
Discharge: HOME | End: 2024-06-17
Attending: EMERGENCY MEDICINE
Payer: COMMERCIAL

## 2024-06-16 VITALS
TEMPERATURE: 98 F | RESPIRATION RATE: 19 BRPM | HEART RATE: 86 BPM | DIASTOLIC BLOOD PRESSURE: 74 MMHG | SYSTOLIC BLOOD PRESSURE: 137 MMHG | OXYGEN SATURATION: 95 %

## 2024-06-16 DIAGNOSIS — T78.40XA ALLERGIC REACTION, INITIAL ENCOUNTER: ICD-10-CM

## 2024-06-16 DIAGNOSIS — R22.0 FACIAL SWELLING: Primary | ICD-10-CM

## 2024-06-16 PROCEDURE — 99283 EMERGENCY DEPT VISIT LOW MDM: CPT

## 2024-06-16 PROCEDURE — 6370000000 HC RX 637 (ALT 250 FOR IP): Performed by: PHYSICIAN ASSISTANT

## 2024-06-16 PROCEDURE — 93005 ELECTROCARDIOGRAM TRACING: CPT | Performed by: EMERGENCY MEDICINE

## 2024-06-16 RX ORDER — FAMOTIDINE 20 MG/1
20 TABLET, FILM COATED ORAL ONCE
Status: COMPLETED | OUTPATIENT
Start: 2024-06-16 | End: 2024-06-16

## 2024-06-16 RX ADMIN — FAMOTIDINE 20 MG: 20 TABLET ORAL at 22:58

## 2024-06-17 LAB
EKG ATRIAL RATE: 84 BPM
EKG DIAGNOSIS: NORMAL
EKG P AXIS: 50 DEGREES
EKG P-R INTERVAL: 222 MS
EKG Q-T INTERVAL: 456 MS
EKG QRS DURATION: 170 MS
EKG QTC CALCULATION (BAZETT): 538 MS
EKG R AXIS: 86 DEGREES
EKG T AXIS: 23 DEGREES
EKG VENTRICULAR RATE: 84 BPM

## 2024-06-17 PROCEDURE — 93010 ELECTROCARDIOGRAM REPORT: CPT | Performed by: INTERNAL MEDICINE

## 2024-06-17 NOTE — ED PROVIDER NOTES
[facility] Emergency Department      Pt Name: Jemma Brown  MRN: [id number]  Birthdate 1950  Date of evaluation: 6/16/2024  Provider: KARTHIK JAMA MD  I personally saw Jemma Brown and made and approved the management plan with the advanced practice provider.  I take responsibility for the patient management.     HPI: Jemma Brown presented with   Chief Complaint   Patient presents with    Facial Swelling     Brought in by EMS from [facility]. PT has swelling to left side of face. States she had allergic reaction Friday but was given benadryl.      Jemma Brown has a past medical history of Anemia, Asthma, Bipolar 1 disorder ([facility]), COPD (chronic obstructive pulmonary disease) ([facility]), Diabetes mellitus ([facility]), GERD (gastroesophageal reflux disease), Hypertension, Osteoarthritis, Overactive bladder, Thyroid cancer ([facility]), and Thyroid disease.  She has a past surgical history that includes Total hip arthroplasty; shoulder surgery; Thyroid surgery; and Mastectomy.    No current facility-administered medications on file prior to encounter.     Current Outpatient Medications on File Prior to Encounter   Medication Sig Dispense Refill    lidocaine (LIDODERM) 5 % Place 1 patch onto the skin daily 12 hours on, 12 hours off. 30 patch 0    atorvastatin (LIPITOR) 10 MG tablet Take 10 mg by mouth daily      diphenhydrAMINE (BENADRYL) 25 MG tablet Take 25 mg by mouth every 6 hours as needed for Itching      clopidogrel (PLAVIX) 75 MG tablet Take 75 mg by mouth daily      famotidine (PEPCID) 20 MG tablet Take 20 mg by mouth every 6 hours as needed      ipratropium-albuterol (DUONEB) 0.5-2.5 (3) MG/3ML SOLN nebulizer solution Inhale 1 vial into the lungs every 6 hours as needed for Shortness of Breath      Multiple Vitamins-Minerals (THERAPEUTIC MULTIVITAMIN-MINERALS) tablet Take 1 tablet by mouth daily      nitroGLYCERIN (NITROSTAT) 0.4 MG SL tablet Place 0.4 mg under the tongue every 5 minutes as needed for

## 2024-06-17 NOTE — ED PROVIDER NOTES
[facility] EMERGENCY DEPARTMENT  EMERGENCY DEPARTMENT ENCOUNTER        Pt Name: Jemma Brown  MRN: [id number]  Birthdate 1950  Date of evaluation: 6/16/2024  Provider: Chelle Otto PA-C  PCP: Rudi Faith MD  Note Started: 10:18 PM EDT 6/16/24       I have seen and evaluated this patient with my supervising physician Mirtha Blanchard,*.      CHIEF COMPLAINT       Chief Complaint   Patient presents with    Facial Swelling     Brought in by EMS from [facility]. PT has swelling to left side of face. States she had allergic reaction Friday but was given benadryl.        HISTORY OF PRESENT ILLNESS: 1 or more Elements     History From: Patient  Limitations to history : None    Jemma Brown is a 74 y.o. female who presents to the emergency department today for evaluation for concerns of left-sided facial swelling.  Patient is from [facility].  Patient reports that she started with an allergic reaction on Friday, and states that she did have some swelling around her left eye.  The patient reports that she has been taking Benadryl at the nursing home facility.  Patient reports that she has not had any Benadryl earlier since this morning.  She states that when she looked in the mirror she states that she noticed some swelling to her left cheek, and was concerned, which prompted her visit to the ED.  Patient has no headaches.  No visual changes.  She has no numbness, feeling or weakness.  She has no chest pain or shortness of breath.  She has no recent illnesses including fever, cough, vomiting or diarrhea.  She denies symptoms or lightheadedness, no other complaint    Nursing Notes were all reviewed and agreed with or any disagreements were addressed in the HPI.    REVIEW OF SYSTEMS :      Review of Systems   Constitutional:  Negative for activity change, appetite change, chills and fever.   HENT:  Positive for facial swelling. Negative for congestion and rhinorrhea.

## 2024-10-26 ENCOUNTER — APPOINTMENT (OUTPATIENT)
Dept: GENERAL RADIOLOGY | Age: 74
End: 2024-10-26
Payer: COMMERCIAL

## 2024-10-26 ENCOUNTER — HOSPITAL ENCOUNTER (EMERGENCY)
Age: 74
Discharge: HOME | End: 2024-10-26
Attending: EMERGENCY MEDICINE
Payer: COMMERCIAL

## 2024-10-26 VITALS
WEIGHT: 230 LBS | SYSTOLIC BLOOD PRESSURE: 141 MMHG | HEIGHT: 65 IN | HEART RATE: 82 BPM | DIASTOLIC BLOOD PRESSURE: 85 MMHG | OXYGEN SATURATION: 91 % | BODY MASS INDEX: 38.32 KG/M2 | TEMPERATURE: 98.3 F | RESPIRATION RATE: 15 BRPM

## 2024-10-26 DIAGNOSIS — J20.8 ACUTE VIRAL BRONCHITIS: ICD-10-CM

## 2024-10-26 DIAGNOSIS — R06.2 WHEEZING: ICD-10-CM

## 2024-10-26 DIAGNOSIS — U07.1 COVID-19: Primary | ICD-10-CM

## 2024-10-26 LAB
ALBUMIN SERPL-MCNC: 3.6 G/DL (ref 3.4–5)
ALBUMIN/GLOB SERPL: 1 {RATIO} (ref 1.1–2.2)
ALP SERPL-CCNC: 64 U/L (ref 40–129)
ALT SERPL-CCNC: 14 U/L (ref 10–40)
ANION GAP SERPL CALCULATED.3IONS-SCNC: 13 MMOL/L (ref 3–16)
AST SERPL-CCNC: 36 U/L (ref 15–37)
BASOPHILS # BLD: 0 K/UL (ref 0–0.2)
BASOPHILS NFR BLD: 0.7 %
BILIRUB SERPL-MCNC: <0.2 MG/DL (ref 0–1)
BUN SERPL-MCNC: 21 MG/DL (ref 7–20)
CALCIUM SERPL-MCNC: 9.5 MG/DL (ref 8.3–10.6)
CHLORIDE SERPL-SCNC: 99 MMOL/L (ref 99–110)
CO2 SERPL-SCNC: 29 MMOL/L (ref 21–32)
CREAT SERPL-MCNC: 0.9 MG/DL (ref 0.6–1.2)
DEPRECATED RDW RBC AUTO: 14.4 % (ref 12.4–15.4)
EKG ATRIAL RATE: 83 BPM
EKG DIAGNOSIS: NORMAL
EKG P AXIS: 43 DEGREES
EKG P-R INTERVAL: 166 MS
EKG Q-T INTERVAL: 456 MS
EKG QRS DURATION: 168 MS
EKG QTC CALCULATION (BAZETT): 535 MS
EKG R AXIS: 104 DEGREES
EKG T AXIS: 18 DEGREES
EKG VENTRICULAR RATE: 83 BPM
EOSINOPHIL # BLD: 0.3 K/UL (ref 0–0.6)
EOSINOPHIL NFR BLD: 4 %
FLUAV RNA RESP QL NAA+PROBE: NOT DETECTED
FLUBV RNA RESP QL NAA+PROBE: NOT DETECTED
GFR SERPLBLD CREATININE-BSD FMLA CKD-EPI: 68 ML/MIN/{1.73_M2}
GLUCOSE SERPL-MCNC: 122 MG/DL (ref 70–99)
HCT VFR BLD AUTO: 38.2 % (ref 36–48)
HGB BLD-MCNC: 12.4 G/DL (ref 12–16)
LYMPHOCYTES # BLD: 1.3 K/UL (ref 1–5.1)
LYMPHOCYTES NFR BLD: 19.3 %
MAGNESIUM SERPL-MCNC: 1.48 MG/DL (ref 1.8–2.4)
MCH RBC QN AUTO: 29.3 PG (ref 26–34)
MCHC RBC AUTO-ENTMCNC: 32.5 G/DL (ref 31–36)
MCV RBC AUTO: 90 FL (ref 80–100)
MONOCYTES # BLD: 0.8 K/UL (ref 0–1.3)
MONOCYTES NFR BLD: 12.1 %
NEUTROPHILS # BLD: 4.3 K/UL (ref 1.7–7.7)
NEUTROPHILS NFR BLD: 63.9 %
NT-PROBNP SERPL-MCNC: 143 PG/ML (ref 0–124)
PLATELET # BLD AUTO: 275 K/UL (ref 135–450)
PMV BLD AUTO: 7.4 FL (ref 5–10.5)
POTASSIUM SERPL-SCNC: 3.3 MMOL/L (ref 3.5–5.1)
PROT SERPL-MCNC: 7.1 G/DL (ref 6.4–8.2)
RBC # BLD AUTO: 4.25 M/UL (ref 4–5.2)
SARS-COV-2 RNA RESP QL NAA+PROBE: DETECTED
SODIUM SERPL-SCNC: 141 MMOL/L (ref 136–145)
TROPONIN, HIGH SENSITIVITY: 13 NG/L (ref 0–14)
WBC # BLD AUTO: 6.7 K/UL (ref 4–11)

## 2024-10-26 PROCEDURE — 80053 COMPREHEN METABOLIC PANEL: CPT

## 2024-10-26 PROCEDURE — 93005 ELECTROCARDIOGRAM TRACING: CPT | Performed by: EMERGENCY MEDICINE

## 2024-10-26 PROCEDURE — 6370000000 HC RX 637 (ALT 250 FOR IP): Performed by: EMERGENCY MEDICINE

## 2024-10-26 PROCEDURE — 99285 EMERGENCY DEPT VISIT HI MDM: CPT

## 2024-10-26 PROCEDURE — 83735 ASSAY OF MAGNESIUM: CPT

## 2024-10-26 PROCEDURE — 84484 ASSAY OF TROPONIN QUANT: CPT

## 2024-10-26 PROCEDURE — 94640 AIRWAY INHALATION TREATMENT: CPT

## 2024-10-26 PROCEDURE — 85025 COMPLETE CBC W/AUTO DIFF WBC: CPT

## 2024-10-26 PROCEDURE — 87636 SARSCOV2 & INF A&B AMP PRB: CPT

## 2024-10-26 PROCEDURE — 83880 ASSAY OF NATRIURETIC PEPTIDE: CPT

## 2024-10-26 PROCEDURE — 93010 ELECTROCARDIOGRAM REPORT: CPT | Performed by: INTERNAL MEDICINE

## 2024-10-26 PROCEDURE — 71045 X-RAY EXAM CHEST 1 VIEW: CPT

## 2024-10-26 RX ORDER — PREDNISONE 20 MG/1
20 TABLET ORAL 2 TIMES DAILY
Qty: 10 TABLET | Refills: 0 | Status: SHIPPED | OUTPATIENT
Start: 2024-10-26 | End: 2024-10-31

## 2024-10-26 RX ORDER — PREDNISONE 20 MG/1
60 TABLET ORAL ONCE
Status: COMPLETED | OUTPATIENT
Start: 2024-10-26 | End: 2024-10-26

## 2024-10-26 RX ORDER — IPRATROPIUM BROMIDE AND ALBUTEROL SULFATE 2.5; .5 MG/3ML; MG/3ML
1 SOLUTION RESPIRATORY (INHALATION) ONCE
Status: COMPLETED | OUTPATIENT
Start: 2024-10-26 | End: 2024-10-26

## 2024-10-26 RX ADMIN — IPRATROPIUM BROMIDE AND ALBUTEROL SULFATE 1 DOSE: .5; 3 SOLUTION RESPIRATORY (INHALATION) at 09:22

## 2024-10-26 RX ADMIN — PREDNISONE 60 MG: 20 TABLET ORAL at 09:06

## 2024-10-26 ASSESSMENT — ENCOUNTER SYMPTOMS
SORE THROAT: 0
GASTROINTESTINAL NEGATIVE: 1
COUGH: 1
WHEEZING: 1
ABDOMINAL PAIN: 0
SHORTNESS OF BREATH: 1

## 2024-10-26 NOTE — ED PROVIDER NOTES
[facility] EMERGENCY DEPARTMENT  EMERGENCY DEPARTMENT ENCOUNTER        Pt Name: Jemma Brown  MRN: [id number]  Birthdate 1950  Date of evaluation: 10/26/2024  Provider: Moe Aguillon MD  PCP: Rudi Faith MD  Note Started: 9:48 AM EDT 10/26/24    CHIEF COMPLAINT       Chief Complaint   Patient presents with    Cough     Patient arrived via  EMS from [facility], states she has had a cough that started earlier this week. States she also is experiencing dizziness. Denies any cp.        HISTORY OF PRESENT ILLNESS: 1 or more Elements     History from : Patient and EMS    Limitations to history : None    Jemma Brown is a 74 y.o. female who presents with cough, congestion, shortness of breath that has been going on for a week.  Patient feels like when she coughs she gets dizzy and lightheaded.  Not presently having any dizziness or lightheadedness no chest pain.  No known sick contacts arrives via EMS from [facility].  Patient states that she has had some wheezing.  States she has a history of COPD.  She does not wear oxygen at baseline.  Does not regularly use breathing treatment states she has sleep apnea, diabetes, hypertension.  Nothing else seems make her symptoms better or worse.    Nursing Notes were all reviewed and agreed with or any disagreements were addressed in the HPI.    REVIEW OF SYSTEMS :      Review of Systems   Constitutional: Negative.    HENT:  Positive for congestion. Negative for sore throat.    Respiratory:  Positive for cough, shortness of breath and wheezing.    Cardiovascular: Negative.  Negative for chest pain.   Gastrointestinal: Negative.  Negative for abdominal pain.   Genitourinary: Negative.    Musculoskeletal: Negative.    Neurological:  Positive for dizziness. Negative for headaches.       Positives and Pertinent negatives as per HPI.     SURGICAL HISTORY     Past Surgical History:   Procedure Laterality  MG TABLET    Take 400 mg by mouth 2 times daily as needed. May restart in AM.    IPRATROPIUM-ALBUTEROL (DUONEB) 0.5-2.5 (3) MG/3ML SOLN NEBULIZER SOLUTION    Inhale 1 vial into the lungs every 6 hours as needed for Shortness of Breath    LEVOTHYROXINE (LEVOTHROID) 137 MCG TABLET    Take 200 mcg by mouth daily     LIDOCAINE (LIDODERM) 5 %    Place 1 patch onto the skin daily 12 hours on, 12 hours off.    LORAZEPAM (ATIVAN) 0.5 MG TABLET    Take 0.5 mg by mouth every 8 hours as needed for Anxiety.    MAGNESIUM OXIDE (MAG-OX) 400 MG TABLET    Take 400 mg by mouth 2 times daily.    MELATONIN    Take 1 mg by mouth daily.    METFORMIN (GLUCOPHAGE) 500 MG TABLET    Take 1,000 mg by mouth 3 times daily (with meals).    METOPROLOL (LOPRESSOR) 25 MG TABLET    Take 12.5 mg by mouth daily.    MINERAL OIL-HYDROPHILIC PETROLATUM (HYDROPHOR) OINTMENT    Apply  topically as needed. Apply topically as needed.     MONTELUKAST (SINGULAIR) 10 MG TABLET    Take 10 mg by mouth nightly.    MULTIPLE VITAMINS-MINERALS (THERAPEUTIC MULTIVITAMIN-MINERALS) TABLET    Take 1 tablet by mouth daily    NITROGLYCERIN (NITROSTAT) 0.4 MG SL TABLET    Place 0.4 mg under the tongue every 5 minutes as needed for Chest pain up to max of 3 total doses. If no relief after 1 dose, call 911.    NYSTATIN POWD    by Does not apply route 2 times daily. Apply topically to affected area    OLOPATADINE HCL 0.2 % SOLN    Apply 1 drop to eye daily. Both eyes    SERTRALINE (ZOLOFT) 100 MG TABLET    Take 100 mg by mouth daily.      SITAGLIPTAN (JANUVIA) 100 MG TABLET    Take 100 mg by mouth daily.    TIZANIDINE (ZANAFLEX) 4 MG TABLET    Take 4 mg by mouth nightly    TOLTERODINE (DETROL) 2 MG TABLET    Take 2 mg by mouth 2 times daily.    TRAZODONE (DESYREL) 100 MG TABLET    Take 100 mg by mouth nightly.       ALLERGIES     Codeine, Lisinopril, Pcn [penicillins], Phosphate, and Sulfa antibiotics    FAMILYHISTORY     No family history on file.     SOCIAL HISTORY

## 2024-10-26 NOTE — ED NOTES
Attempted to call report to [facility] assisted living x2, unable to reach staff.

## 2024-10-26 NOTE — ED NOTES
Patient transferred from ED stretcher to CMT stretcher with no incidence    Pt leaving ED at this time to return to [facility] with CMT.